# Patient Record
Sex: MALE | Race: WHITE | NOT HISPANIC OR LATINO | Employment: FULL TIME | URBAN - METROPOLITAN AREA
[De-identification: names, ages, dates, MRNs, and addresses within clinical notes are randomized per-mention and may not be internally consistent; named-entity substitution may affect disease eponyms.]

---

## 2017-11-01 ENCOUNTER — CLINICAL SUPPORT (OUTPATIENT)
Dept: AUDIOLOGY | Facility: CLINIC | Age: 67
End: 2017-11-01
Payer: COMMERCIAL

## 2017-11-01 DIAGNOSIS — H90.3 SENSORINEURAL HEARING LOSS, BILATERAL: Primary | ICD-10-CM

## 2017-11-01 PROCEDURE — 92626 EVAL AUD FUNCJ 1ST HOUR: CPT | Mod: S$GLB,,, | Performed by: AUDIOLOGIST

## 2017-11-01 NOTE — PROGRESS NOTES
DOS:  8/25/2009  Left CI  Beige N6 w #2 beige magnet  2014 Right Widex D FS JAMES  (discussed upgrading to Resound 3DLinx with custom mold when he was ready)     Mr. Cox was seen today for his annual audiogram and cochlear implant/hearing aid programming session.  Below is his audiogram.  Both ears seem very stable.  There was no significant decrease in hearing for either ear.  Instead, aided and CI scores remain good.  Mr. Cox does not present with any complaints are concerns today.  Instead, he states that he is able to hear and communicate very well.      A slight increase in boost was given to his hearing aid at 250 Hz.      A discussion took place about new implant technology, I.e. N7 and KANSO devices.  Mr. Cox is not due for an upgrade until 2020 at which time his N6 will be five years old.  We discussed the advantages of having Medicare for cochlear upgrades and accessories.  Mr. Cox will compare his current policy to Medicare and then make a decision on which one to get for the next years ahead.      There was no programming of the N6 today.  Mr. Cox did not need any adjustments.  However, he did need a few new components.  His equipment is under warranty until Jan 8 2018.  A coil and cable was requested (RMA) online.  Both looked worn down and the cable was very twisted inside needing replacement.     MD one year or sooner if he needs any adjustments.

## 2018-09-12 ENCOUNTER — CLINICAL SUPPORT (OUTPATIENT)
Dept: AUDIOLOGY | Facility: CLINIC | Age: 68
End: 2018-09-12
Payer: MEDICARE

## 2018-09-12 DIAGNOSIS — H90.3 SENSORINEURAL HEARING LOSS, BILATERAL: Primary | ICD-10-CM

## 2018-09-12 PROCEDURE — 99499 UNLISTED E&M SERVICE: CPT | Mod: S$PBB,,, | Performed by: AUDIOLOGIST

## 2018-09-12 NOTE — PROGRESS NOTES
HEARING AID FOLLOW UP VISIT  Serial Number: [id number]  Original Invoice Date: 7/30/14   Circuit Number: [id number]   Last Repair Date: 11/07/16  Trial Expiration Date: 10/28/14  Item Description: DREAM 330 FUSION, WARM BEIGE W/MAGDI SHELL ATTACHED   Repair Expiration Date: 11/07/17   Remake Expiration Date: 7/30/14   info:  MA 2    Mr. Cox was seen today for hearing aid problems.  He stated that his aid was non functional even after changing the wax guard and the battery.  A listening check was performed and it was dead.  A new MA 2 speaker was put on his aid and it worked.  Pt was charged $150.  Per the contract, the speaker is under warranty for  30 days.  Pt was not informed of this by either me or Jane so if this  happens to come back soon, it might need to be replaced at no charge since pt was not informed.      PRN.

## 2018-11-30 ENCOUNTER — CLINICAL SUPPORT (OUTPATIENT)
Dept: AUDIOLOGY | Facility: CLINIC | Age: 68
End: 2018-11-30
Payer: MEDICARE

## 2018-11-30 DIAGNOSIS — H90.3 SENSORINEURAL HEARING LOSS, BILATERAL: Primary | ICD-10-CM

## 2018-11-30 PROCEDURE — 92604 REPROGRAM COCHLEAR IMPLT 7/>: CPT | Mod: PBBFAC | Performed by: AUDIOLOGIST

## 2018-11-30 NOTE — PROGRESS NOTES
COCHLEAR IMPLANT FOLLOW UP    HL HX:  SSNHL LT 2002  RT- decreasing shortly after that  (There was concern that the right ear was going to go out too.  However, it's been showing stable hearing since.)    Left Ear:  -  Cochlear  Internal Device/Serial Number- [id number]  Processor- N6  DOS: 8/25/2009  DIS: 9/21/2009  Fitting Date-1/6/2015  Processor Color- beige  Magnet Strength- #3  Coil Length- 6cm  Battery Type- std [facility]  Warranty-  2/27/2018  Medicare Upgrade:  1/6/2020    Mr. Cox was seen today for his annual audiogram.  Thresholds for the non implant ear shifted slightly.  Unfortunately, due to HA software problems, Mr. Cox's hearing aid could not be increased in volume as he requested.  He will be scheduled asap to get this done.  His implant ear; however, was checked through CS.  Impedance was measured- WNL and he was given a slight increase in t and c levels in the 6kHz (last 3 elecrodes) where his threshold on the audiogram took a slight dip.  Other then that, there were no other changes made to his implant.  He stated still using all programs for different reasons.  Below are his maps:  P1- # 24- New with increase in t and c levels on the last 3 electrodes.  P2- # 22- ADRO- old prog with NO SCAN  P3- # 20- ADRO- slightly louder then P2  P4- # 23 - ADRO WHISPER- MUSIC

## 2020-05-18 ENCOUNTER — TELEPHONE (OUTPATIENT)
Dept: AUDIOLOGY | Facility: CLINIC | Age: 70
End: 2020-05-18

## 2020-05-18 DIAGNOSIS — H90.3 SENSORY HEARING LOSS, BILATERAL: Primary | ICD-10-CM

## 2020-05-20 ENCOUNTER — CLINICAL SUPPORT (OUTPATIENT)
Dept: AUDIOLOGY | Facility: CLINIC | Age: 70
End: 2020-05-20
Payer: MEDICARE

## 2020-05-20 DIAGNOSIS — H90.3 SENSORINEURAL HEARING LOSS, BILATERAL: Primary | ICD-10-CM

## 2020-05-20 PROCEDURE — 92557 COMPREHENSIVE HEARING TEST: CPT | Mod: PBBFAC | Performed by: AUDIOLOGIST

## 2020-05-20 PROCEDURE — 99499 NO LOS: ICD-10-PCS | Mod: S$PBB,,, | Performed by: AUDIOLOGIST

## 2020-05-20 PROCEDURE — 99499 UNLISTED E&M SERVICE: CPT | Mod: S$PBB,,, | Performed by: AUDIOLOGIST

## 2020-05-20 NOTE — PROGRESS NOTES
Cyrus Liao Kenny was seen today for a hearing evaluation.     Pure tone audiometry revealed a moderate SNHL for the right ear; Hearing within 20-25dB for the left ear - Cochlear Implant Ear  SRT and PTA are in good agreement bilaterally.  Excellent speech discrimination for the right ear: Good for the left ear       Recommendations:  1. Otologic Evaluation  2. Annual Audiogram or repeat audiogram as needed  3. Hearing Protection  4. Continue use of amplification

## 2020-05-20 NOTE — PROGRESS NOTES
Mr. Cox was seen today for an annual audiogram and HAFU. Mr. Cox stated when he took off his mask his hearing aid fell and the earmold was cracked. An impression was made for his right ear for a new earmold. The hearing aid was also re-programmed to his updated audiogram.     Lastly, Mr. Cox is due for an upgrade to the N7 cochlear implant for his left ear. The request to start the upgrade was sent to [place] today. He will follow-up when his upgrade is delivered.

## 2020-06-03 ENCOUNTER — DOCUMENTATION ONLY (OUTPATIENT)
Dept: AUDIOLOGY | Facility: CLINIC | Age: 70
End: 2020-06-03

## 2020-06-03 NOTE — PROGRESS NOTES
Received new earmold from [employer] for P   Rt SN [id number]  L/D Exp 6/1/20  Repair and Remake Exp 9/1/20

## 2020-06-04 ENCOUNTER — DOCUMENTATION ONLY (OUTPATIENT)
Dept: AUDIOLOGY | Facility: CLINIC | Age: 70
End: 2020-06-04

## 2020-06-18 ENCOUNTER — TELEPHONE (OUTPATIENT)
Dept: AUDIOLOGY | Facility: CLINIC | Age: 70
End: 2020-06-18

## 2020-06-18 DIAGNOSIS — H90.3 SENSORY HEARING LOSS, BILATERAL: Primary | ICD-10-CM

## 2020-06-19 ENCOUNTER — CLINICAL SUPPORT (OUTPATIENT)
Dept: AUDIOLOGY | Facility: CLINIC | Age: 70
End: 2020-06-19
Payer: MEDICARE

## 2020-06-19 DIAGNOSIS — H90.3 SENSORINEURAL HEARING LOSS, BILATERAL: Primary | ICD-10-CM

## 2020-06-19 PROCEDURE — 99499 NO LOS: ICD-10-PCS | Mod: S$PBB,,, | Performed by: AUDIOLOGIST

## 2020-06-19 PROCEDURE — 99499 UNLISTED E&M SERVICE: CPT | Mod: S$PBB,,, | Performed by: AUDIOLOGIST

## 2020-06-19 NOTE — PROGRESS NOTES
COCHLEAR IMPLANT- N7 Upgrade     HL HX:  SSNHL LT 2002    Left Ear:  -  Cochlear  Internal Device/Serial Number- [id number]  Processor- N7   SN: [id number]  DOS: 8/25/2009  DIS: 9/21/2009  Fitting Date-6/2/2020 - When Cochlear programmed his upgraded N7  Processor Color- beige  Magnet Strength- #3  Coil Length- 6cm  Battery Type- std [facility]  Warranty-  2/27/2018  Medicare Upgrade:  June 2023     Mr. Cox was seen today to verify programs with his new [id number] (N7) processor for his left ear. All programs were verified in the software. We reviewed using telecoil for his phone.   No programming changes made today.     P1- # 24- New with increase in t and c levels on the last 3 electrodes.  P2- # 22- ADRO- old prog with NO SCAN  P3- # 20- ADRO- slightly louder then P2  P4- # 23 - ADRO WHISPER- MUSIC    Recommendations  1. Follow-up at least annual with audiogram or PRN

## 2021-04-22 ENCOUNTER — OFFICE VISIT (OUTPATIENT)
Dept: URGENT CARE | Facility: CLINIC | Age: 71
End: 2021-04-22
Payer: MEDICARE

## 2021-04-22 VITALS
OXYGEN SATURATION: 99 % | DIASTOLIC BLOOD PRESSURE: 86 MMHG | HEIGHT: 70 IN | WEIGHT: 174 LBS | BODY MASS INDEX: 24.91 KG/M2 | RESPIRATION RATE: 18 BRPM | TEMPERATURE: 98 F | HEART RATE: 62 BPM | SYSTOLIC BLOOD PRESSURE: 133 MMHG

## 2021-04-22 DIAGNOSIS — S61.211A LACERATION OF LEFT INDEX FINGER WITHOUT FOREIGN BODY WITHOUT DAMAGE TO NAIL, INITIAL ENCOUNTER: Primary | ICD-10-CM

## 2021-04-22 PROCEDURE — 90715 TDAP VACCINE GREATER THAN OR EQUAL TO 7YO IM: ICD-10-PCS | Mod: AT,S$GLB,, | Performed by: NURSE PRACTITIONER

## 2021-04-22 PROCEDURE — 99203 OFFICE O/P NEW LOW 30 MIN: CPT | Mod: 25,S$GLB,, | Performed by: NURSE PRACTITIONER

## 2021-04-22 PROCEDURE — 99203 PR OFFICE/OUTPT VISIT, NEW, LEVL III, 30-44 MIN: ICD-10-PCS | Mod: 25,S$GLB,, | Performed by: NURSE PRACTITIONER

## 2021-04-22 PROCEDURE — 90471 TDAP VACCINE GREATER THAN OR EQUAL TO 7YO IM: ICD-10-PCS | Mod: S$GLB,,, | Performed by: NURSE PRACTITIONER

## 2021-04-22 PROCEDURE — 90715 TDAP VACCINE 7 YRS/> IM: CPT | Mod: AT,S$GLB,, | Performed by: NURSE PRACTITIONER

## 2021-04-22 PROCEDURE — 90471 IMMUNIZATION ADMIN: CPT | Mod: S$GLB,,, | Performed by: NURSE PRACTITIONER

## 2021-04-22 RX ORDER — CEPHALEXIN 500 MG/1
500 CAPSULE ORAL EVERY 12 HOURS
Qty: 10 CAPSULE | Refills: 0 | Status: SHIPPED | OUTPATIENT
Start: 2021-04-22 | End: 2021-04-27

## 2021-04-22 RX ORDER — MUPIROCIN 20 MG/G
OINTMENT TOPICAL 2 TIMES DAILY
Qty: 30 G | Refills: 0 | Status: SHIPPED | OUTPATIENT
Start: 2021-04-22 | End: 2021-04-27

## 2021-04-29 ENCOUNTER — OFFICE VISIT (OUTPATIENT)
Dept: URGENT CARE | Facility: CLINIC | Age: 71
End: 2021-04-29
Payer: MEDICARE

## 2021-04-29 VITALS
BODY MASS INDEX: 24.91 KG/M2 | WEIGHT: 174 LBS | RESPIRATION RATE: 18 BRPM | TEMPERATURE: 98 F | OXYGEN SATURATION: 95 % | SYSTOLIC BLOOD PRESSURE: 108 MMHG | HEIGHT: 70 IN | DIASTOLIC BLOOD PRESSURE: 73 MMHG | HEART RATE: 69 BPM

## 2021-04-29 DIAGNOSIS — Z48.02 ENCOUNTER FOR REMOVAL OF SUTURES: Primary | ICD-10-CM

## 2021-04-29 PROCEDURE — 99024 SUTURE REMOVAL: ICD-10-PCS | Mod: S$GLB,POP,, | Performed by: NURSE PRACTITIONER

## 2021-04-29 PROCEDURE — 99024 POSTOP FOLLOW-UP VISIT: CPT | Mod: S$GLB,POP,, | Performed by: NURSE PRACTITIONER

## 2021-05-01 ENCOUNTER — CLINICAL SUPPORT (OUTPATIENT)
Dept: URGENT CARE | Facility: CLINIC | Age: 71
End: 2021-05-01
Payer: MEDICARE

## 2021-05-01 VITALS
BODY MASS INDEX: 24.91 KG/M2 | TEMPERATURE: 98 F | HEIGHT: 70 IN | WEIGHT: 174 LBS | SYSTOLIC BLOOD PRESSURE: 120 MMHG | HEART RATE: 100 BPM | DIASTOLIC BLOOD PRESSURE: 74 MMHG

## 2021-05-01 DIAGNOSIS — Z48.89 ENCOUNTER FOR POST SURGICAL WOUND CHECK: Primary | ICD-10-CM

## 2021-05-01 PROCEDURE — 99214 PR OFFICE/OUTPT VISIT, EST, LEVL IV, 30-39 MIN: ICD-10-PCS | Mod: S$GLB,,, | Performed by: FAMILY MEDICINE

## 2021-05-01 PROCEDURE — 99214 OFFICE O/P EST MOD 30 MIN: CPT | Mod: S$GLB,,, | Performed by: FAMILY MEDICINE

## 2021-06-17 ENCOUNTER — CLINICAL SUPPORT (OUTPATIENT)
Dept: AUDIOLOGY | Facility: CLINIC | Age: 71
End: 2021-06-17
Payer: MEDICARE

## 2021-06-17 DIAGNOSIS — H90.3 SENSORINEURAL HEARING LOSS, BILATERAL: Primary | ICD-10-CM

## 2021-06-17 PROCEDURE — 99499 UNLISTED E&M SERVICE: CPT | Mod: S$PBB,,, | Performed by: AUDIOLOGIST

## 2021-06-17 PROCEDURE — 99499 NO LOS: ICD-10-PCS | Mod: S$PBB,,, | Performed by: AUDIOLOGIST

## 2021-07-06 ENCOUNTER — DOCUMENTATION ONLY (OUTPATIENT)
Dept: AUDIOLOGY | Facility: CLINIC | Age: 71
End: 2021-07-06

## 2021-07-06 ENCOUNTER — TELEPHONE (OUTPATIENT)
Dept: AUDIOLOGY | Facility: CLINIC | Age: 71
End: 2021-07-06

## 2021-07-12 ENCOUNTER — DOCUMENTATION ONLY (OUTPATIENT)
Dept: AUDIOLOGY | Facility: CLINIC | Age: 71
End: 2021-07-12

## 2021-07-12 ENCOUNTER — TELEPHONE (OUTPATIENT)
Dept: AUDIOLOGY | Facility: CLINIC | Age: 71
End: 2021-07-12

## 2022-03-28 ENCOUNTER — OFFICE VISIT (OUTPATIENT)
Dept: URGENT CARE | Facility: CLINIC | Age: 72
End: 2022-03-28
Payer: MEDICARE

## 2022-03-28 VITALS
SYSTOLIC BLOOD PRESSURE: 158 MMHG | OXYGEN SATURATION: 96 % | TEMPERATURE: 99 F | DIASTOLIC BLOOD PRESSURE: 83 MMHG | BODY MASS INDEX: 24.91 KG/M2 | RESPIRATION RATE: 16 BRPM | WEIGHT: 174 LBS | HEART RATE: 85 BPM | HEIGHT: 70 IN

## 2022-03-28 DIAGNOSIS — S61.211A LACERATION OF LEFT INDEX FINGER WITHOUT FOREIGN BODY WITHOUT DAMAGE TO NAIL, INITIAL ENCOUNTER: Primary | ICD-10-CM

## 2022-03-28 PROCEDURE — 99213 OFFICE O/P EST LOW 20 MIN: CPT | Mod: S$GLB,,,

## 2022-03-28 PROCEDURE — 99213 PR OFFICE/OUTPT VISIT, EST, LEVL III, 20-29 MIN: ICD-10-PCS | Mod: S$GLB,,,

## 2022-03-28 RX ORDER — PHENYTOIN SODIUM 100 MG/1
CAPSULE, EXTENDED RELEASE ORAL
COMMUNITY
Start: 2021-12-27

## 2022-03-28 RX ORDER — ZOSTER VACCINE RECOMBINANT, ADJUVANTED 50 MCG/0.5
KIT INTRAMUSCULAR
COMMUNITY
Start: 2022-02-04

## 2022-03-28 RX ORDER — MUPIROCIN 20 MG/G
OINTMENT TOPICAL 3 TIMES DAILY
Qty: 22 G | Refills: 0 | Status: SHIPPED | OUTPATIENT
Start: 2022-03-28 | End: 2022-04-04

## 2022-03-28 NOTE — PROGRESS NOTES
"Subjective:       Patient ID: Cyrus Cox is a 71 y.o. male.    Vitals:  height is 5' 10" (1.778 m) and weight is 78.9 kg (174 lb). His oral temperature is 98.5 °F (36.9 °C). His blood pressure is 158/83 (abnormal) and his pulse is 85. His respiration is 16 and oxygen saturation is 96%.     Chief Complaint: Laceration    Patient presents with a 1 cm small laceration on his left finger that occurred an hour ago. Patient was using a pocket knife to cut a garden tool and cut himself. Patient states the knife was clean.  He denies tingling or numbness.  Last Tdap was in 2021.     Laceration   The incident occurred less than 1 hour ago. The laceration is located on the left hand. The laceration is 1 cm in size. The laceration mechanism was a clean knife. The pain is at a severity of 0/10. The patient is experiencing no pain. He reports no foreign bodies present. His tetanus status is UTD.       Constitution: Negative for chills, sweating, fatigue and fever.   HENT: Negative for ear pain, congestion, sinus pain, sinus pressure, sore throat and trouble swallowing.    Neck: Negative for neck pain and neck stiffness.   Eyes: Negative for eye pain and eye redness.   Respiratory: Negative for cough and sputum production.    Gastrointestinal: Negative for nausea, vomiting, constipation and diarrhea.   Musculoskeletal: Positive for pain and trauma.   Skin: Positive for laceration and erythema. Negative for hives.   Allergic/Immunologic: Negative for hives, itching and sneezing.   Neurological: Negative for disorientation and altered mental status.   Psychiatric/Behavioral: Negative for altered mental status, disorientation and confusion.       Objective:      Physical Exam   Constitutional: He is oriented to person, place, and time. He appears well-developed.   HENT:   Head: Normocephalic and atraumatic. Head is without abrasion, without contusion and without laceration.   Ears:   Right Ear: External ear normal.   Left " Ear: External ear normal.   Nose: Nose normal.   Mouth/Throat: Oropharynx is clear and moist and mucous membranes are normal.   Eyes: Conjunctivae, EOM and lids are normal. Pupils are equal, round, and reactive to light.   Neck: Trachea normal and phonation normal. Neck supple.   Cardiovascular: Normal rate, regular rhythm and normal heart sounds.   Pulmonary/Chest: Effort normal and breath sounds normal. No stridor. No respiratory distress.   Musculoskeletal: Normal range of motion.         General: Normal range of motion.        Hands:    Neurological: He is alert and oriented to person, place, and time.   Skin: Skin is warm, dry, intact and no rash. Capillary refill takes less than 2 seconds. erythema No abrasion, No burn, No bruising and No ecchymosis         Comments: Superficial 1 cm linear laceration noted to the left index finger along the lateral side over the MCP joint.  The bleeding is well controlled.  There is full range of motion of the index finger.  Cap refill less than 2 seconds.   Psychiatric: His speech is normal and behavior is normal. Judgment and thought content normal.   Nursing note and vitals reviewed.        Assessment:       1. Laceration of left index finger without foreign body without damage to nail, initial encounter          Plan:         Laceration of left index finger without foreign body without damage to nail, initial encounter  -     mupirocin (BACTROBAN) 2 % ointment; Apply topically 3 (three) times daily. for 7 days  Dispense: 22 g; Refill: 0                 Patient Instructions   - clean 2 x per day with warm water and mild soap. Place antibiotic ointment to wound after cleaning. Keep open at home and covered in public placed.     - Rest.    - Drink plenty of fluids.    - Acetaminophen (tylenol) or Ibuprofen (advil,motrin) as directed as needed for fever/pain. Avoid tylenol if you have a history of liver disease. Do not take ibuprofen if you have a history of GI bleeding,  kidney disease, or if you take blood thinners.     - You must understand that you have received an Urgent Care treatment only and that you may be released before all of your medical problems are known or treated.   - You, the patient, will arrange for follow up care as instructed.   - If your condition worsens or fails to improve we recommend that you receive another evaluation at the ER immediately or contact your PCP to discuss your concerns or return here.   - Follow up with your PCP or specialty clinic as directed in the next 1-2 weeks if not improved or as needed.  You can call [phone] to schedule an appointment with the appropriate provider.    If your symptoms do not improve or worsen, go to the emergency room immediately.

## 2022-03-28 NOTE — PATIENT INSTRUCTIONS
- clean 2 x per day with warm water and mild soap. Place antibiotic ointment to wound after cleaning. Keep open at home and covered in public placed.     - Rest.    - Drink plenty of fluids.    - Acetaminophen (tylenol) or Ibuprofen (advil,motrin) as directed as needed for fever/pain. Avoid tylenol if you have a history of liver disease. Do not take ibuprofen if you have a history of GI bleeding, kidney disease, or if you take blood thinners.     - You must understand that you have received an Urgent Care treatment only and that you may be released before all of your medical problems are known or treated.   - You, the patient, will arrange for follow up care as instructed.   - If your condition worsens or fails to improve we recommend that you receive another evaluation at the ER immediately or contact your PCP to discuss your concerns or return here.   - Follow up with your PCP or specialty clinic as directed in the next 1-2 weeks if not improved or as needed.  You can call [phone] to schedule an appointment with the appropriate provider.    If your symptoms do not improve or worsen, go to the emergency room immediately.

## 2023-12-15 ENCOUNTER — TELEPHONE (OUTPATIENT)
Dept: AUDIOLOGY | Facility: CLINIC | Age: 73
End: 2023-12-15
Payer: MEDICARE

## 2023-12-15 NOTE — TELEPHONE ENCOUNTER
----- Message from Nilam Levin sent at 12/15/2023  1:54 PM CST -----  Regarding: pt advice  Contact: [phone]  Pt calling in regards to having one hearing missing , pt wants to speak to someone regards to he needs to have a hearing test done to be able to get have hearing aid replaced. Pl call      How Severe Is Your Acne?: mild Is This A New Presentation, Or A Follow-Up?: Acne

## 2023-12-19 ENCOUNTER — CLINICAL SUPPORT (OUTPATIENT)
Dept: AUDIOLOGY | Facility: CLINIC | Age: 73
End: 2023-12-19
Payer: MEDICARE

## 2023-12-19 DIAGNOSIS — H90.3 SENSORINEURAL HEARING LOSS, BILATERAL: Primary | ICD-10-CM

## 2023-12-19 PROCEDURE — 99999PBSHW PR PBB SHADOW TECHNICAL ONLY FILED TO HB: ICD-10-PCS | Mod: 52,PBBFAC,,

## 2023-12-19 PROCEDURE — 92556 SPEECH AUDIOMETRY COMPLETE: CPT | Mod: 52,PBBFAC

## 2023-12-19 PROCEDURE — 92552 PURE TONE AUDIOMETRY AIR: CPT | Mod: PBBFAC

## 2023-12-19 PROCEDURE — 99999PBSHW PR PBB SHADOW TECHNICAL ONLY FILED TO HB: Mod: 52,PBBFAC,,

## 2023-12-19 PROCEDURE — 92567 TYMPANOMETRY: CPT | Mod: 52,PBBFAC

## 2023-12-20 NOTE — PROGRESS NOTES
Hearing Aid Consultation      Mr. Cyrus Cox was seen today for a hearing aid consultation. He has a cochlear implant in the left ear. Mr. Cox previously wore a Pocket Change hearing aid and while he was pleased with the performance overall, the last few months he felt is was not helping his with communication. He reported having difficulty understanding speech specifically when background noise is present. We discussed his hearing loss as well as its effects on his daily life. Hearing aid options were presented and discussed along with the different levels of technology. Mr. Cox acknowledged understanding of the non-refundable $250 deposit, the 30 day trial period, and the fact we do not file insurance on behalf of the patient.      Mr. Cox decided he'd like to move forward with a ReSound Omnia 5 hearing aid for the right ear so as to be compatible with bimodal streaming. An impression was taken without incident. He was scheduled to come in on 01/24/2024 for his hearing aid fitting.

## 2023-12-20 NOTE — PROGRESS NOTES
Cyrus Cox was seen today in the clinic for an annual audiologic evaluation. Mr. Cox reported that he recently lost his right hearing aid and is interested in obtaining a new device. He has a cochlear implant in the left ear. He denied otalgia, aural fullness, tinnitus, and vertigo today. Previous audiologic testing revealed moderate sensorineural hearing loss (SNHL) in the right ear.     Tympanometry revealed Type A in the right ear.     Audiogram results revealed moderate to severe SNHL in the right ear. Speech reception thresholds were noted at 50 dB in the right ear with 88% speech discrimination score.      Aided testing was completed with the patients N7 sound processor at user settings in sound field. Responses to narrowband noise stimuli were obtained from 20-30 dB HL. An aided SRT was obtained at 20 dB HL with 96% speech discrimination score.    Recommendations:  Continued consistent use of cochlear implant sound processor  Hearing aid consultation  Hearing protection when in noise  Annual audiogram

## 2024-01-12 ENCOUNTER — CLINICAL SUPPORT (OUTPATIENT)
Dept: AUDIOLOGY | Facility: CLINIC | Age: 74
End: 2024-01-12
Payer: MEDICARE

## 2024-01-12 DIAGNOSIS — H90.3 SENSORINEURAL HEARING LOSS, BILATERAL: Primary | ICD-10-CM

## 2024-01-16 NOTE — PROGRESS NOTES
Hearing Aid Evaluation  Mr. Cox was seen today for a hearing aid evaluation. Device was programmed to latest audiogram and overall gain was decreased to 90%. Device was paired to his CI processor in custom sound. Devices were also connected to his cell phone for willard and streaming. Use was reviewed and he demonstrated understanding.    Care and maintenance of hearing aid was reviewed. Mr. Cox was able to place device in  and in ear without difficulty. Mr. Cox acknowledged understanding of the non-refundable $250 deposit, the 30 day trial period, and the fact we do not file insurance on behalf of the patient.     Hearing Aid Information:  ReSound Omia 5   [employer]   RT SN: [id number]   Repair and L/D Exp 1/27/2027    SN: [id number]   Repair Exp: 1/27/2027   L/D Exp: 1/27/2025     HP Encased Micro-Mold   RT SN: [id number]   : 3HP   Repair and Remake Warranty: 6/27/2024     Recommend:  Continued consistent use of hearing aid and CI processor  Annual audiogram   Annual device check/programming

## 2024-01-26 ENCOUNTER — CLINICAL SUPPORT (OUTPATIENT)
Dept: AUDIOLOGY | Facility: CLINIC | Age: 74
End: 2024-01-26
Payer: MEDICARE

## 2024-01-26 DIAGNOSIS — H90.3 SENSORINEURAL HEARING LOSS, BILATERAL: Primary | ICD-10-CM

## 2024-01-26 PROCEDURE — 99499 UNLISTED E&M SERVICE: CPT | Mod: S$PBB,,,

## 2024-01-26 NOTE — PROGRESS NOTES
Hearing Aid Follow Up    Mr. Cox was seen today for his two week hearing aid follow up. He reported that he is pleased overall with the performance of his device. He enjoys the streaming abilities now that his hearing aid and cochlear implant are able to stream together. Mr. Cox was perceiving a slight echo.    Device was cleaned - found to be in good working condition. Through out the appointment, his perception of the echo reduced. No programming changes were made to either his hearing aid or CI at this time.    He was reminded that his 30 day trial period will be ending soon and if he needs anything he will contact our clinic.    Hearing Aid Information:  ReSound Omia 5   [employer]   RT SN: [id number]   Repair and L/D Exp 1/27/2027    SN: [id number]   Repair Exp: 1/27/2027   L/D Exp: 1/27/2025     HP Encased Micro-Mold   RT SN: [id number]   : 3HP   Repair and Remake Warranty: 6/27/2024      Recommend:  Continued consistent use of hearing aid and CI processor  Annual audiogram   Annual device check/programming

## 2024-10-16 ENCOUNTER — DOCUMENTATION ONLY (OUTPATIENT)
Dept: AUDIOLOGY | Facility: CLINIC | Age: 74
End: 2024-10-16
Payer: MEDICARE

## 2024-10-16 NOTE — PROGRESS NOTES
Mr. Cox called 10/14/2024 because his lost his hearing aid and wanted to know if he should file an L&D claim for the lost device. Mr. Cox was informed of the $300 deductible. He will continue to look for his lost hearing aid through the end of the week and will contact the [facility] to let us know if we look like to file the L&D claim.

## 2025-07-16 ENCOUNTER — DOCUMENTATION ONLY (OUTPATIENT)
Dept: AUDIOLOGY | Facility: CLINIC | Age: 75
End: 2025-07-16
Payer: MEDICARE

## 2025-07-24 ENCOUNTER — CLINICAL SUPPORT (OUTPATIENT)
Dept: AUDIOLOGY | Facility: CLINIC | Age: 75
End: 2025-07-24
Payer: MEDICARE

## 2025-07-24 DIAGNOSIS — H90.3 SENSORINEURAL HEARING LOSS, BILATERAL: Primary | ICD-10-CM

## 2025-07-24 NOTE — PROGRESS NOTES
Hearing Aid Follow-up:    Mr. Cox was seen today for a hearing aid follow-up appointment to  his repaired hearing aid. The hearing aid was paired to the [employer] fitting software and reprogrammed to his previous settings. Settings were switched to experienced user and gain was set to 90%. Mr. Cox reported comfortable sound quality. The hearing aid was paired to Mr. Cox's cochlear implant in custom sound. The hearing aid and sound processor were paired to Mr. Cox's phone. He will f/u in 6 months for audio/CIFU/HAFU or sooner if needed.       Invoice Date: 12-  Hearing Aid Information:  ReSound Omia 5   [facility]   New RT SN: [id number]   Repair and L/D Exp 1/27/2027    SN: [id number]   Repair + L/D Exp: 1/27/2027      HP Enc[employer] Micro-Mold   RT SN: [id number]   : 3HP   Repair and Remake Warranty: 6/27/2024